# Patient Record
Sex: FEMALE | Race: BLACK OR AFRICAN AMERICAN
[De-identification: names, ages, dates, MRNs, and addresses within clinical notes are randomized per-mention and may not be internally consistent; named-entity substitution may affect disease eponyms.]

---

## 2017-03-31 ENCOUNTER — HOSPITAL ENCOUNTER (INPATIENT)
Dept: HOSPITAL 23 - CED | Age: 37
LOS: 2 days | Discharge: HOME | DRG: 897 | End: 2017-04-02
Admitting: FAMILY MEDICINE
Payer: COMMERCIAL

## 2017-03-31 DIAGNOSIS — G40.909: ICD-10-CM

## 2017-03-31 DIAGNOSIS — F41.9: ICD-10-CM

## 2017-03-31 DIAGNOSIS — Z71.51: ICD-10-CM

## 2017-03-31 DIAGNOSIS — R11.2: ICD-10-CM

## 2017-03-31 DIAGNOSIS — Z98.51: ICD-10-CM

## 2017-03-31 DIAGNOSIS — Z83.3: ICD-10-CM

## 2017-03-31 DIAGNOSIS — F12.988: Primary | ICD-10-CM

## 2017-03-31 DIAGNOSIS — Z82.49: ICD-10-CM

## 2017-03-31 DIAGNOSIS — Z87.891: ICD-10-CM

## 2017-03-31 LAB
BARBITURATES UR QL SCN: 0.7 MG/DL (ref 0.2–2)
BARBITURATES UR QL SCN: 4.8 G/DL (ref 3.5–5)
BASOPHIL#: 0.1 X10E3 (ref 0–0.3)
BASOPHIL%: 0.3 % (ref 0–2.5)
BENZODIAZ UR QL SCN: 24 U/L (ref 10–42)
BENZODIAZ UR QL SCN: 27 U/L (ref 10–40)
BLOOD UREA NITROGEN: 15 MG/DL (ref 9–23)
BUN/CREATININE RATIO: 16.66
BZE UR QL SCN: 101 U/L (ref 32–92)
CALCIUM SERUM: 10.2 MG/DL (ref 8.4–10.2)
CK MB SERPL-RTO: 14.4 % (ref 11–15.5)
CK MB SERPL-RTO: 31.1 G/DL (ref 30–36)
CREATININE SERUM: 0.9 MG/DL (ref 0.6–1.4)
DIFF IND: YES
EOSINOPHIL#: 0 X10E3 (ref 0–0.7)
EOSINOPHIL%: 0 % (ref 0–7)
GENTAMICIN PEAK SERPL-MCNC: YES MG/L
GLOM FILT RATE ESTIMATED: 96.1 ML/MIN (ref 60–?)
GLUCOSE FASTING: 125 MG/DL (ref 70–110)
HEMATOCRIT: 42 % (ref 35–45)
HEMOGLOBIN: 13.1 GM/DL (ref 12–16)
INFLUENZA A: (no result)
INFLUENZA B: (no result)
KETONES UR QL: 105 MMOL/L (ref 100–111)
KETONES UR QL: 21 MMOL/L (ref 22–31)
LIPASE: 160 U/L (ref 22–51)
LYMPHOCYTE#: 2.1 X10E3 (ref 1–3.5)
LYMPHOCYTE%: 9.2 % (ref 17–45)
LYMPHOCYTE: 9 % (ref 17–50)
MEAN CELL VOLUME: 83 FL (ref 83–96)
MEAN CORPUSCULAR HEMOGLOBIN: 25.8 PG (ref 28–34)
MEAN PLATELET VOLUME: 10.2 FL (ref 6.5–11.5)
MONOCYTE#: 0.8 X10E3 (ref 0–1)
MONOCYTE%: 3.4 % (ref 3–12)
MONOCYTE: 7 % (ref 3–12)
NEUTROPHIL#: 19.9 X10E3 (ref 1.5–7.1)
NEUTROPHIL%: 87.1 % (ref 40–75)
NEUTROPHIL: 84 % (ref 40–75)
PLATELET COUNT: 356 X10E3 (ref 140–420)
PLATELET ESTIMATE: NORMAL
POTASSIUM: 3.6 MMOL/L (ref 3.5–5.1)
PROTEIN TOTAL SERUM: 8.8 G/DL (ref 6–8.3)
RBC NORMAL: YES
RED BLOOD COUNT: 5.06 X10E (ref 3.9–5.3)
SODIUM: 138 MMOL/L (ref 135–145)
U HYALINE CASTS AUWI: (no result) /[LPF]
URBCS1 AUWI: (no result) /[HPF] (ref 0–2)
URINE APPEARANCE: (no result)
URINE BACTERIA AUWI: (no result)
URINE BILIRUBIN: (no result)
URINE BLOOD: (no result)
URINE COLOR: (no result)
URINE GLUCOSE: (no result) MG/DL
URINE KETONE: (no result)
URINE LEUKOCYTE ESTERASE: (no result)
URINE MUCUS: PRESENT
URINE NITRATE: (no result)
URINE PH: 5.5 (ref 5–8)
URINE PROTEIN: (no result)
URINE SOURCE: (no result)
URINE SPECIFIC GRAVITY: 1.04 (ref 1–1.03)
URINE SQUAMOUS EPITHELIAL CELL: (no result) /[HPF]
URINE UROBILINOGEN: 1 MG/DL
UWBCS1 AUWI: (no result) (ref 0–5)
WHITE BLOOD COUNT: 22.9 X10E3 (ref 4–10.5)

## 2017-03-31 PROCEDURE — C9113 INJ PANTOPRAZOLE SODIUM, VIA: HCPCS

## 2017-04-01 LAB
AMYLASE: 14 U/L (ref 0–46)
BARBITURATES UR QL SCN: 0.5 MG/DL (ref 0.2–2)
BARBITURATES UR QL SCN: 3.9 G/DL (ref 3.5–5)
BENZODIAZ UR QL SCN: 17 U/L (ref 10–42)
BENZODIAZ UR QL SCN: 19 U/L (ref 10–40)
BLOOD UREA NITROGEN: 14 MG/DL (ref 9–23)
BUN/CREATININE RATIO: 20
BZE UR QL SCN: 77 U/L (ref 32–92)
CALCIUM SERUM: 9.1 MG/DL (ref 8.4–10.2)
CHOLESTEROL: 194 MG/DL (ref 0–200)
CK MB SERPL-RTO: 14.4 % (ref 11–15.5)
CK MB SERPL-RTO: 31.4 G/DL (ref 30–36)
CREATININE SERUM: 0.7 MG/DL (ref 0.6–1.4)
GLOM FILT RATE ESTIMATED: 130.1 ML/MIN (ref 60–?)
GLUCOSE FASTING: 85 MG/DL (ref 70–110)
HDL CHOLESTEROL: 34 MG/DL (ref 35–95)
HEMATOCRIT: 38.1 % (ref 35–45)
HEMOGLOBIN: 12 GM/DL (ref 12–16)
KETONES UR QL: 107 MMOL/L (ref 100–111)
KETONES UR QL: 18 MMOL/L (ref 22–31)
LDL CHOLESTEROL: 141 MG/DL (ref ?–130)
LDL/HDL RATIO: 4 RATIO (ref 0–4)
LIPASE: 10 U/L (ref 22–51)
MEAN CELL VOLUME: 83.3 FL (ref 83–96)
MEAN CORPUSCULAR HEMOGLOBIN: 26.2 PG (ref 28–34)
MEAN PLATELET VOLUME: 9.8 FL (ref 6.5–11.5)
PLATELET COUNT: 306 X10E3 (ref 140–420)
POTASSIUM: 3.3 MMOL/L (ref 3.5–5.1)
PROTEIN TOTAL SERUM: 6.9 G/DL (ref 6–8.3)
RED BLOOD COUNT: 4.57 X10E (ref 3.9–5.3)
SODIUM: 136 MMOL/L (ref 135–145)
TRIGLYCERIDES: 96 MG/DL (ref 10–160)
WHITE BLOOD COUNT: 23 X10E3 (ref 4–10.5)

## 2017-04-02 LAB
BARBITURATES UR QL SCN: 0.6 MG/DL (ref 0.2–2)
BARBITURATES UR QL SCN: 0.7 MG/DL (ref 0.2–2)
BARBITURATES UR QL SCN: 3.6 G/DL (ref 3.5–5)
BARBITURATES UR QL SCN: 4 G/DL (ref 3.5–5)
BASOPHIL#: 0.1 X10E3 (ref 0–0.3)
BASOPHIL%: 0.5 % (ref 0–2.5)
BENZODIAZ UR QL SCN: 16 U/L (ref 10–42)
BENZODIAZ UR QL SCN: 20 U/L (ref 10–40)
BENZODIAZ UR QL SCN: 20 U/L (ref 10–42)
BENZODIAZ UR QL SCN: 24 U/L (ref 10–40)
BLOOD UREA NITROGEN: 10 MG/DL (ref 9–23)
BLOOD UREA NITROGEN: 11 MG/DL (ref 9–23)
BUN/CREATININE RATIO: 14.28
BUN/CREATININE RATIO: 15.71
BZE UR QL SCN: 68 U/L (ref 32–92)
BZE UR QL SCN: 74 U/L (ref 32–92)
CALCIUM SERUM: 8.8 MG/DL (ref 8.4–10.2)
CALCIUM SERUM: 9.2 MG/DL (ref 8.4–10.2)
CK MB SERPL-RTO: 14.3 % (ref 11–15.5)
CK MB SERPL-RTO: 32.1 G/DL (ref 30–36)
CREATININE SERUM: 0.7 MG/DL (ref 0.6–1.4)
CREATININE SERUM: 0.7 MG/DL (ref 0.6–1.4)
DIFF IND: NO
EOSINOPHIL#: 0 X10E3 (ref 0–0.7)
EOSINOPHIL%: 0.2 % (ref 0–7)
GLOM FILT RATE ESTIMATED: 130.1 ML/MIN (ref 60–?)
GLOM FILT RATE ESTIMATED: 130.1 ML/MIN (ref 60–?)
GLUCOSE FASTING: 110 MG/DL (ref 70–110)
GLUCOSE FASTING: 86 MG/DL (ref 70–110)
HEMATOCRIT: 35.1 % (ref 35–45)
HEMOGLOBIN: 11.3 GM/DL (ref 12–16)
KETONES UR QL: 103 MMOL/L (ref 100–111)
KETONES UR QL: 105 MMOL/L (ref 100–111)
KETONES UR QL: 22 MMOL/L (ref 22–31)
KETONES UR QL: 22 MMOL/L (ref 22–31)
LYMPHOCYTE#: 3 X10E3 (ref 1–3.5)
LYMPHOCYTE%: 17.7 % (ref 17–45)
MEAN CELL VOLUME: 81.7 FL (ref 83–96)
MEAN CORPUSCULAR HEMOGLOBIN: 26.2 PG (ref 28–34)
MEAN PLATELET VOLUME: 9.5 FL (ref 6.5–11.5)
MONOCYTE#: 1 X10E3 (ref 0–1)
MONOCYTE%: 6.1 % (ref 3–12)
NEUTROPHIL#: 12.7 X10E3 (ref 1.5–7.1)
NEUTROPHIL%: 75.5 % (ref 40–75)
PLATELET COUNT: 283 X10E3 (ref 140–420)
POTASSIUM: 2.8 MMOL/L (ref 3.5–5.1)
POTASSIUM: 3.1 MMOL/L (ref 3.5–5.1)
PROTEIN TOTAL SERUM: 6.9 G/DL (ref 6–8.3)
PROTEIN TOTAL SERUM: 7.5 G/DL (ref 6–8.3)
RED BLOOD COUNT: 4.3 X10E (ref 3.9–5.3)
SODIUM: 135 MMOL/L (ref 135–145)
SODIUM: 137 MMOL/L (ref 135–145)
WHITE BLOOD COUNT: 16.8 X10E3 (ref 4–10.5)

## 2020-07-08 ENCOUNTER — OFFICE (OUTPATIENT)
Dept: URBAN - METROPOLITAN AREA CLINIC 75 | Facility: CLINIC | Age: 40
End: 2020-07-08
Payer: COMMERCIAL

## 2020-07-08 VITALS — HEIGHT: 63 IN | RESPIRATION RATE: 16 BRPM | WEIGHT: 220 LBS | TEMPERATURE: 97.1 F

## 2020-07-08 DIAGNOSIS — R93.3 ABNORMAL FINDINGS ON DIAGNOSTIC IMAGING OF OTHER PARTS OF DI: ICD-10-CM

## 2020-07-08 DIAGNOSIS — R25.2 CRAMP AND SPASM: ICD-10-CM

## 2020-07-08 DIAGNOSIS — R10.31 RIGHT LOWER QUADRANT PAIN: ICD-10-CM

## 2020-07-08 PROCEDURE — 99204 OFFICE O/P NEW MOD 45 MIN: CPT | Performed by: INTERNAL MEDICINE

## 2020-07-08 NOTE — SERVICENOTES
Impression:
39F with abdominal pain and vomiting associated with an abnormal CT scan. She did have several liver lesions noted. We will order an MRI liver mass protocol to further evaluate this. She also had thickening of her sigmoid, descending, and transverse colon. We will set her up for a colonoscopy. Follow-up in 3 months.

## 2020-07-08 NOTE — SERVICEHPINOTES
7/8/2020   I did have the pleasure of seeing   LINDA Fleming  1980   at the request of   MICHAEL ONOFRE  for a new patient consultation in our [facility] office. I sincerely appreciate the opportunity to participate in the care of this patient.  Ms. Elizabeth is a 39F presenting for evaluation of abdominal pain and an abnormal CT scan. She states that about one month ago she began to have a lot of vomiting and abdominal pain. She describes abdominal pain as being in her right lower quadrant and right lower middle area. It is very crampy and sometimes sharp. At times it is relieved with having a bowel movement. She had a tubal ligation several years ago and afterwards had issues with pain that required a subsequent surgery. She sometimes feels like her current pain feels similar to that. No blood in her stools.No prior colonoscopy.  No prior EGD.She went emergency room for these symptoms. She had an abdominopelvic CT scan that showed an ill-defined low attenuation lesion in her liver adjacent to her IVC as well as the second low attenuation lesion possibly fatty infiltration. She also diffuse wall thickening of her sigmoid, descending, and transverse colon.She has not been having any diarrhea.

## 2020-08-18 VITALS
RESPIRATION RATE: 28 BRPM | RESPIRATION RATE: 7 BRPM | DIASTOLIC BLOOD PRESSURE: 88 MMHG | SYSTOLIC BLOOD PRESSURE: 140 MMHG | SYSTOLIC BLOOD PRESSURE: 120 MMHG | HEART RATE: 82 BPM | SYSTOLIC BLOOD PRESSURE: 113 MMHG | RESPIRATION RATE: 19 BRPM | HEART RATE: 86 BPM | SYSTOLIC BLOOD PRESSURE: 109 MMHG | DIASTOLIC BLOOD PRESSURE: 70 MMHG | SYSTOLIC BLOOD PRESSURE: 112 MMHG | DIASTOLIC BLOOD PRESSURE: 79 MMHG | RESPIRATION RATE: 18 BRPM | TEMPERATURE: 96.8 F | DIASTOLIC BLOOD PRESSURE: 68 MMHG | SYSTOLIC BLOOD PRESSURE: 99 MMHG | SYSTOLIC BLOOD PRESSURE: 108 MMHG | HEART RATE: 87 BPM | HEART RATE: 79 BPM | HEART RATE: 88 BPM | DIASTOLIC BLOOD PRESSURE: 80 MMHG | OXYGEN SATURATION: 100 % | HEART RATE: 84 BPM | RESPIRATION RATE: 16 BRPM | OXYGEN SATURATION: 99 % | RESPIRATION RATE: 15 BRPM | DIASTOLIC BLOOD PRESSURE: 75 MMHG | HEIGHT: 63 IN | WEIGHT: 220 LBS | TEMPERATURE: 97.8 F | DIASTOLIC BLOOD PRESSURE: 76 MMHG | DIASTOLIC BLOOD PRESSURE: 74 MMHG | HEART RATE: 85 BPM

## 2020-08-21 ENCOUNTER — OFFICE (OUTPATIENT)
Dept: URBAN - METROPOLITAN AREA LAB 2 | Facility: LAB | Age: 40
End: 2020-08-21
Payer: COMMERCIAL

## 2020-08-21 DIAGNOSIS — Z11.59 ENCOUNTER FOR SCREENING FOR OTHER VIRAL DISEASES: ICD-10-CM

## 2020-08-21 PROCEDURE — 87633 RESP VIRUS 12-25 TARGETS: CPT | Performed by: INTERNAL MEDICINE

## 2020-08-25 ENCOUNTER — AMBULATORY SURGICAL CENTER (OUTPATIENT)
Dept: URBAN - METROPOLITAN AREA SURGERY 17 | Facility: SURGERY | Age: 40
End: 2020-08-25
Payer: COMMERCIAL

## 2020-08-25 ENCOUNTER — OFFICE (OUTPATIENT)
Dept: URBAN - METROPOLITAN AREA PATHOLOGY 4 | Facility: PATHOLOGY | Age: 40
End: 2020-08-25
Payer: COMMERCIAL

## 2020-08-25 DIAGNOSIS — K63.5 POLYP OF COLON: ICD-10-CM

## 2020-08-25 DIAGNOSIS — D12.3 BENIGN NEOPLASM OF TRANSVERSE COLON: ICD-10-CM

## 2020-08-25 DIAGNOSIS — R93.3 ABNORMAL FINDINGS ON DIAGNOSTIC IMAGING OF OTHER PARTS OF DI: ICD-10-CM

## 2020-08-25 LAB
GI HISTOLOGY: A. UNSPECIFIED: (no result)
GI HISTOLOGY: B. SELECT: (no result)
GI HISTOLOGY: C. SELECT: (no result)
GI HISTOLOGY: D. SELECT: (no result)
GI HISTOLOGY: PDF REPORT: (no result)

## 2020-08-25 PROCEDURE — 88305 TISSUE EXAM BY PATHOLOGIST: CPT | Performed by: INTERNAL MEDICINE

## 2020-08-25 PROCEDURE — 45385 COLONOSCOPY W/LESION REMOVAL: CPT | Performed by: INTERNAL MEDICINE

## 2020-08-25 PROCEDURE — 45380 COLONOSCOPY AND BIOPSY: CPT | Mod: 59 | Performed by: INTERNAL MEDICINE
